# Patient Record
Sex: MALE | Race: BLACK OR AFRICAN AMERICAN | NOT HISPANIC OR LATINO
[De-identification: names, ages, dates, MRNs, and addresses within clinical notes are randomized per-mention and may not be internally consistent; named-entity substitution may affect disease eponyms.]

---
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
## 2020-06-01 NOTE — ASU PREOP CHECKLIST - BSA (M2)
1.89 Doxycycline Counseling:  Patient counseled regarding possible photosensitivity and increased risk for sunburn.  Patient instructed to avoid sunlight, if possible.  When exposed to sunlight, patients should wear protective clothing, sunglasses, and sunscreen.  The patient was instructed to call the office immediately if the following severe adverse effects occur:  hearing changes, easy bruising/bleeding, severe headache, or vision changes.  The patient verbalized understanding of the proper use and possible adverse effects of doxycycline.  All of the patient's questions and concerns were addressed.

## 2020-08-03 NOTE — ED PROVIDER NOTE - CROS ED GI ALL NEG
[FreeTextEntry1] : see lab orders \par \par decrease LT4 from 125mcg qd to 112mcg qd \par \par reassess levels 2M  negative...

## 2024-04-23 NOTE — ED PROVIDER NOTE - ATTENDING CONTRIBUTION TO CARE
done
The resident's documentation has been prepared under my direction and personally reviewed by me in its entirety. I confirm that the note above accurately reflects all work, treatment, procedures, and medical decision making performed by me,  Marty Gloria MD

## 2024-08-15 NOTE — ASU PREOP CHECKLIST - ASSESSMENT, HISTORY & PHYSICAL COMPLETED AND ON MEDICAL RECORD
done [SOB] : no shortness of breath [Chest Discomfort] : chest discomfort [Lower Ext Edema] : no extremity edema [Palpitations] : no palpitations [Syncope] : no syncope [Dizziness] : no dizziness [Confusion] : no confusion was observed [Easy Bruising] : no tendency for easy bruising
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
## 2025-04-27 NOTE — H&P PST PEDIATRIC - HEAD, EARS, EYES, NOSE AND THROAT
Throat culture-   Throat Culture Positive for beta-hemolytic Streptococcus, not Group A          Patient was called.  was left to call to go over. Patient was prescribed antibiotics at appointment on 4/24/25. If patient is still having symptoms recommend starting antibiotics as prescribed.   
2+tonsils, no exudate or erythema noted.   unable to view b/l TMs due to cerumen occlusion.